# Patient Record
Sex: MALE | Race: BLACK OR AFRICAN AMERICAN | URBAN - METROPOLITAN AREA
[De-identification: names, ages, dates, MRNs, and addresses within clinical notes are randomized per-mention and may not be internally consistent; named-entity substitution may affect disease eponyms.]

---

## 2022-04-28 ENCOUNTER — CLAIMS CREATED FROM THE CLAIM WINDOW (OUTPATIENT)
Dept: URBAN - METROPOLITAN AREA CLINIC 52 | Facility: CLINIC | Age: 77
End: 2022-04-28
Payer: COMMERCIAL

## 2022-04-28 ENCOUNTER — WEB ENCOUNTER (OUTPATIENT)
Dept: URBAN - METROPOLITAN AREA CLINIC 52 | Facility: CLINIC | Age: 77
End: 2022-04-28

## 2022-04-28 ENCOUNTER — TELEPHONE ENCOUNTER (OUTPATIENT)
Dept: URBAN - METROPOLITAN AREA CLINIC 92 | Facility: CLINIC | Age: 77
End: 2022-04-28

## 2022-04-28 ENCOUNTER — LAB OUTSIDE AN ENCOUNTER (OUTPATIENT)
Dept: URBAN - METROPOLITAN AREA CLINIC 52 | Facility: CLINIC | Age: 77
End: 2022-04-28

## 2022-04-28 ENCOUNTER — DASHBOARD ENCOUNTERS (OUTPATIENT)
Age: 77
End: 2022-04-28

## 2022-04-28 VITALS
TEMPERATURE: 97 F | SYSTOLIC BLOOD PRESSURE: 97 MMHG | HEIGHT: 72 IN | HEART RATE: 75 BPM | BODY MASS INDEX: 27.5 KG/M2 | WEIGHT: 203 LBS | DIASTOLIC BLOOD PRESSURE: 58 MMHG

## 2022-04-28 DIAGNOSIS — R63.4 WEIGHT LOSS: ICD-10-CM

## 2022-04-28 DIAGNOSIS — Z12.11 COLON CANCER SCREENING: ICD-10-CM

## 2022-04-28 DIAGNOSIS — K92.1 HEMATOCHEZIA: ICD-10-CM

## 2022-04-28 PROCEDURE — 99202 OFFICE O/P NEW SF 15 MIN: CPT | Performed by: PHYSICIAN ASSISTANT

## 2022-04-28 PROCEDURE — 99204 OFFICE O/P NEW MOD 45 MIN: CPT | Performed by: PHYSICIAN ASSISTANT

## 2022-04-28 RX ORDER — MIDODRINE HYDROCHLORIDE 10 MG/1
1 TABLET TABLET ORAL THREE TIMES A DAY
Status: ACTIVE | COMMUNITY

## 2022-04-28 RX ORDER — ATORVASTATIN CALCIUM 10 MG/1
1 TABLET TABLET, FILM COATED ORAL ONCE A DAY
Status: ACTIVE | COMMUNITY

## 2022-04-28 RX ORDER — ASPIRIN 325 MG
AS DIRECTED TABLET, DELAYED RELEASE (ENTERIC COATED) ORAL
Status: ACTIVE | COMMUNITY

## 2022-04-28 RX ORDER — LORATADINE 10 MG
EMPTY ALL 14 PACKETS INTO 64 OZ OF GATORADE TABLET,DISINTEGRATING ORAL
Qty: 14 | Refills: 0 | OUTPATIENT
Start: 2022-04-29 | End: 2022-04-30

## 2022-04-28 RX ORDER — GABAPENTIN 300 MG/1
AS DIRECTED CAPSULE ORAL
Status: ACTIVE | COMMUNITY

## 2022-04-28 RX ORDER — LIDOCAINE AND PRILOCAINE 25; 25 MG/G; MG/G
AS DIRECTED CREAM TOPICAL
Status: ACTIVE | COMMUNITY

## 2022-04-28 RX ORDER — MIDODRINE HYDROCHLORIDE 10 MG/1
1 TABLET TABLET ORAL
Status: ACTIVE | COMMUNITY

## 2022-04-28 RX ORDER — BENZONATATE 100 MG/1
1 CAPSULE CAPSULE ORAL
Status: ACTIVE | COMMUNITY

## 2022-04-28 RX ORDER — SEVELAMER CARBONATE 800 MG/1
1 TABLET WITH MEALS TABLET, FILM COATED ORAL
Status: ACTIVE | COMMUNITY

## 2022-04-28 RX ORDER — LORATADINE 10 MG
1 PACKET MIXED WITH 8 OUNCES OF FLUID TABLET,DISINTEGRATING ORAL ONCE A DAY
Status: ACTIVE | COMMUNITY

## 2022-04-28 RX ORDER — BISACODYL 5 MG
TAKE 2 TABLETS AT 3 PM AND 2 TABLETS AT 9 PM TABLET, DELAYED RELEASE (ENTERIC COATED) ORAL
Qty: 4 | Refills: 0 | OUTPATIENT
Start: 2022-04-29 | End: 2022-04-30

## 2022-04-28 RX ORDER — ALLOPURINOL 100 MG/1
AS DIRECTED TABLET ORAL
Status: ACTIVE | COMMUNITY

## 2022-04-28 NOTE — PHYSICAL EXAM CHEST:
breathing is unlabored without accessory muscle use, normal breath sounds 
Oriented - self; Oriented - place; Oriented - time

## 2022-04-28 NOTE — HPI-TODAY'S VISIT:
This is a 76 y.o. male with PMH of CHF, DVT, anemia, ESRD (on dialysis), and constipation presents to office for colon cancer screen.  He reports bright red bloody stools that have been off and on for two years. Also reports a 14 pound weight loss over five months and intermittet lower abdominal tightness that is worse after dialysis.  He says his last colonoscopy >20 years ago at \[facility]\"". He cannot remember the findings.  He was referred by Dr. Ghanshyam Jules. All notes will be shared with PCP.

## 2022-05-05 ENCOUNTER — TELEPHONE ENCOUNTER (OUTPATIENT)
Dept: URBAN - METROPOLITAN AREA CLINIC 52 | Facility: CLINIC | Age: 77
End: 2022-05-05

## 2022-05-05 RX ORDER — MIDODRINE HYDROCHLORIDE 10 MG/1
1 TABLET TABLET ORAL THREE TIMES A DAY
Status: ACTIVE | COMMUNITY

## 2022-05-05 RX ORDER — LIDOCAINE AND PRILOCAINE 25; 25 MG/G; MG/G
AS DIRECTED CREAM TOPICAL
Status: ACTIVE | COMMUNITY

## 2022-05-05 RX ORDER — BENZONATATE 100 MG/1
1 CAPSULE CAPSULE ORAL
Status: ACTIVE | COMMUNITY

## 2022-05-05 RX ORDER — LORATADINE 10 MG
1 PACKET MIXED WITH 8 OUNCES OF FLUID TABLET,DISINTEGRATING ORAL ONCE A DAY
Status: ACTIVE | COMMUNITY

## 2022-05-05 RX ORDER — POLYETHYLENE GLYCOL-3350 AND ELECTROLYTES 236; 6.74; 5.86; 2.97; 22.74 G/274.31G; G/274.31G; G/274.31G; G/274.31G; G/274.31G
AS DIRECTED POWDER, FOR SOLUTION ORAL
Qty: 1 CONTAINER | Refills: 0 | OUTPATIENT
Start: 2022-05-06 | End: 2022-05-07

## 2022-05-05 RX ORDER — SEVELAMER CARBONATE 800 MG/1
1 TABLET WITH MEALS TABLET, FILM COATED ORAL
Status: ACTIVE | COMMUNITY

## 2022-05-05 RX ORDER — MIDODRINE HYDROCHLORIDE 10 MG/1
1 TABLET TABLET ORAL
Status: ACTIVE | COMMUNITY

## 2022-05-05 RX ORDER — ATORVASTATIN CALCIUM 10 MG/1
1 TABLET TABLET, FILM COATED ORAL ONCE A DAY
Status: ACTIVE | COMMUNITY

## 2022-05-05 RX ORDER — ASPIRIN 325 MG
AS DIRECTED TABLET, DELAYED RELEASE (ENTERIC COATED) ORAL
Status: ACTIVE | COMMUNITY

## 2022-05-05 RX ORDER — GABAPENTIN 300 MG/1
AS DIRECTED CAPSULE ORAL
Status: ACTIVE | COMMUNITY

## 2022-05-05 RX ORDER — ALLOPURINOL 100 MG/1
AS DIRECTED TABLET ORAL
Status: ACTIVE | COMMUNITY

## 2022-05-06 ENCOUNTER — TELEPHONE ENCOUNTER (OUTPATIENT)
Dept: URBAN - METROPOLITAN AREA CLINIC 94 | Facility: CLINIC | Age: 77
End: 2022-05-06

## 2022-05-06 RX ORDER — POLYETHYLENE GLYCOL-3350 AND ELECTROLYTES 236; 6.74; 5.86; 2.97; 22.74 G/274.31G; G/274.31G; G/274.31G; G/274.31G; G/274.31G
AS DIRECTED POWDER, FOR SOLUTION ORAL
OUTPATIENT
Start: 2022-05-06

## 2022-05-12 ENCOUNTER — OFFICE VISIT (OUTPATIENT)
Dept: URBAN - METROPOLITAN AREA MEDICAL CENTER 34 | Facility: MEDICAL CENTER | Age: 77
End: 2022-05-12
Payer: COMMERCIAL

## 2022-05-12 DIAGNOSIS — D12.3 ADENOMA OF TRANSVERSE COLON: ICD-10-CM

## 2022-05-12 DIAGNOSIS — K92.1 ACUTE MELENA: ICD-10-CM

## 2022-05-12 DIAGNOSIS — R63.4 ABNORMAL INTENTIONAL WEIGHT LOSS: ICD-10-CM

## 2022-05-12 PROCEDURE — 45385 COLONOSCOPY W/LESION REMOVAL: CPT | Performed by: INTERNAL MEDICINE

## 2025-08-26 ENCOUNTER — OFFICE VISIT (OUTPATIENT)
Dept: URBAN - METROPOLITAN AREA CLINIC 17 | Facility: CLINIC | Age: 80
End: 2025-08-26
Payer: COMMERCIAL

## 2025-08-26 DIAGNOSIS — R19.7 OVERFLOW DIARRHEA: ICD-10-CM

## 2025-08-26 DIAGNOSIS — K58.1 IRRITABLE BOWEL SYNDROME WITH CONSTIPATION: ICD-10-CM

## 2025-08-26 PROCEDURE — 99203 OFFICE O/P NEW LOW 30 MIN: CPT

## 2025-08-26 RX ORDER — GABAPENTIN 300 MG/1
AS DIRECTED CAPSULE ORAL
Status: ACTIVE | COMMUNITY

## 2025-08-26 RX ORDER — LIDOCAINE AND PRILOCAINE 25; 25 MG/G; MG/G
AS DIRECTED CREAM TOPICAL
Status: ACTIVE | COMMUNITY

## 2025-08-26 RX ORDER — BENZONATATE 100 MG/1
1 CAPSULE CAPSULE ORAL
Status: ACTIVE | COMMUNITY

## 2025-08-26 RX ORDER — POLYETHYLENE GLYCOL-3350 AND ELECTROLYTES 236; 6.74; 5.86; 2.97; 22.74 G/274.31G; G/274.31G; G/274.31G; G/274.31G; G/274.31G
AS DIRECTED POWDER, FOR SOLUTION ORAL
Status: ACTIVE | COMMUNITY
Start: 2022-05-06

## 2025-08-26 RX ORDER — ALLOPURINOL 100 MG/1
AS DIRECTED TABLET ORAL
Status: ACTIVE | COMMUNITY

## 2025-08-26 RX ORDER — MIDODRINE HYDROCHLORIDE 10 MG/1
1 TABLET TABLET ORAL
Status: ACTIVE | COMMUNITY

## 2025-08-26 RX ORDER — LORATADINE 10 MG
1 PACKET MIXED WITH 8 OUNCES OF FLUID TABLET,DISINTEGRATING ORAL ONCE A DAY
Status: ACTIVE | COMMUNITY

## 2025-08-26 RX ORDER — SEVELAMER CARBONATE 800 MG/1
1 TABLET WITH MEALS TABLET, FILM COATED ORAL
Status: ACTIVE | COMMUNITY

## 2025-08-26 RX ORDER — MIDODRINE HYDROCHLORIDE 10 MG/1
1 TABLET TABLET ORAL THREE TIMES A DAY
Status: ACTIVE | COMMUNITY

## 2025-08-26 RX ORDER — ASPIRIN 325 MG
AS DIRECTED TABLET, DELAYED RELEASE (ENTERIC COATED) ORAL
Status: ACTIVE | COMMUNITY

## 2025-08-26 RX ORDER — ATORVASTATIN CALCIUM 10 MG/1
1 TABLET TABLET, FILM COATED ORAL ONCE A DAY
Status: ACTIVE | COMMUNITY